# Patient Record
Sex: MALE
[De-identification: names, ages, dates, MRNs, and addresses within clinical notes are randomized per-mention and may not be internally consistent; named-entity substitution may affect disease eponyms.]

---

## 2020-10-14 ENCOUNTER — DASHBOARD ENCOUNTERS (OUTPATIENT)
Age: 65
End: 2020-10-14

## 2020-10-14 ENCOUNTER — OFFICE VISIT (OUTPATIENT)
Dept: URBAN - METROPOLITAN AREA CLINIC 113 | Facility: CLINIC | Age: 65
End: 2020-10-14
Payer: MEDICARE

## 2020-10-14 ENCOUNTER — WEB ENCOUNTER (OUTPATIENT)
Dept: URBAN - METROPOLITAN AREA CLINIC 113 | Facility: CLINIC | Age: 65
End: 2020-10-14

## 2020-10-14 VITALS
HEIGHT: 72 IN | DIASTOLIC BLOOD PRESSURE: 97 MMHG | RESPIRATION RATE: 20 BRPM | TEMPERATURE: 98.7 F | BODY MASS INDEX: 23.7 KG/M2 | HEART RATE: 70 BPM | WEIGHT: 175 LBS | SYSTOLIC BLOOD PRESSURE: 177 MMHG

## 2020-10-14 DIAGNOSIS — R19.8 BORBORYGMI: ICD-10-CM

## 2020-10-14 PROCEDURE — 99203 OFFICE O/P NEW LOW 30 MIN: CPT | Performed by: INTERNAL MEDICINE

## 2020-10-14 NOTE — HPI-TODAY'S VISIT:
Hugo Armenta is a 65-year-old male who has generally been in good GI health.  This year, he has had 2 distinct episodes, one in March and 1 last week, where he noted excessive borborygmi in association with a change in bowel habits.  He had an episode in March that resolved after 4 to 5 days.  The second episode began last week.  He noticed increased "grumbling" in his abdomen, followed by loose stools with some sense of fecal urgency.  He drank francie tea beginning 2 days ago, and had a large bowel movement today with complete resolution of the excessive borborygmi.  He denies any abdominal pain, fever, bright red rectal bleeding, etc.  He is concerned about the possibility of diverticulitis as a friend had this recently.  He has normal bowel movement pattern is to have a bowel movement every time he eats, typically formed.  He has never had a prior colonoscopy.  He decided to seek GI consultation after the second episode of excessive borborygmi and a change in bowel habits.